# Patient Record
(demographics unavailable — no encounter records)

---

## 2025-07-28 NOTE — HISTORY OF PRESENT ILLNESS
[FreeTextEntry1] : Reason for visit: Breast lift  Patient Goals: Wants to have a more lifted breast, notes she has always had ptotic breasts and large nipples, even after children    Prior breast surgery: None   Prior breast feeding: 3months with her first child, none with her subsequent pregnancies   Prior breast cancer or mammographic abnormalities: None - not of age for screening    Prior breast lumps, nipple discharge: None   Prior radiation, hormone therapy or genetic testing: None   Breast cancer screening: Clinical exam per PCP, no abnormalities noted   Family history of breast cancer: None    Prior breast trauma: None   Smoker: No   Keloids: Hypertrophic scarring    Autoimmune disease: None   PMH: Pituitary adenoma being worked up    PSH: umbilical hernia repair Aug 2024    DVT/PE: None   Anticoagulation: None   Social: Three children, done having children; ages 2 (Twins), 3   PE: Measurements   Right Breast Breast base width: 12 Nipple to sternal notch: 24.5  Ptosis: 2 Nipple to IMF: 10 NAC: 8   Left Breast Breast base width: 11 Nipple to sternal notch: 25 Ptosis: 2 Nipple to IMF: 10 NAC: 7.5   Breast asymmetry: Moderate, R > L    A/P   The patient is a healthy 25 year-year-old female presenting for elective mastopexy to address concerns of breast ptosis and loss of youthful contour. She reports deflation and descent of the breast tissue and/or nipple-areolar complex, which developed gradually following pregnancy, breastfeeding, weight changes, or natural aging.    We discussed the indications, goals, and limitations of mastopexy. The procedure is intended to elevate and reshape the breast mound, reposition the nipple-areolar complex, and restore a more youthful, lifted appearance. Depending on skin quality and degree of ptosis, we reviewed potential surgical techniques (e.g., periareolar, vertical/lollipop, or Wise/inverted-T incision patterns).   An option for reinforcement of the lower pole was discussed using a resorbable mesh (e.g., GalaFLEX or similar), which may provide internal support to the soft tissue, help maintain breast shape, and potentially enhance the longevity of the mastopexy result. The mesh acts as a scaffold during the healing process, encouraging collagen ingrowth and redistribution of tension. While not required in all cases, it may be particularly beneficial in patients with poor skin quality, heavy breast tissue, or those desiring longer-lasting upper pole support. Risks associated with mesh use, including infection, seroma, palpability, or delayed absorption, were reviewed. The patient understands that the decision to use mesh will be finalized intraoperatively based on tissue quality and intraoperative findings.   Benefits of mastopexy include improved breast shape and position, enhanced symmetry, better fit in clothing, and overall aesthetic improvement. Risks were reviewed in detail and include, but are not limited to:     Bleeding, hematoma, infection, or seroma formation   Delayed wound healing, particularly at the junction of incision lines   Poor scarring or hypertrophic/keloid scars   Loss of nipple or skin sensation (temporary or permanent)   Partial or total nipple-areolar complex necrosis (rare)   Asymmetry or need for revision surgery   Breastfeeding difficulty in the future (depending on technique)   Changes in long-term results due to aging, gravity, or weight fluctuations   Anesthesia-related complications   We also discussed that mastopexy does not increase upper pole fullness or breast volume, and that some patients may benefit from concurrent augmentation if volume enhancement is desired. Conversely, if the breast is overly heavy, a reduction component may be necessary for optimal outcome.   The patient verbalizes understanding of the risks, benefits, and alternatives, including doing nothing. She has realistic expectations and understands that while the goal is improvement in contour and position, perfect symmetry or permanent results cannot be guaranteed. The longevity of results may vary based on skin quality, aging, weight stability, and hormonal changes. Informed consent will be obtained, and preoperative photographs and markings will be completed on the day of surgery. The patient understands the need for postoperative follow-up and adherence to activity restrictions to ensure optimal healing.   Recommendation and Plan:  Decision for Surgery: Mastopexy only with Galaflex (will provide quote separately), Wise excision given degree of ptosis.    _ No surgery planned   _ OK to schedule surgery   _ Surgery scheduled   X Needs clearance - Patient will need neurology clearance prior to surgery given her pituitary adenoma    _ No clearance needed